# Patient Record
Sex: MALE | Race: WHITE | NOT HISPANIC OR LATINO | URBAN - METROPOLITAN AREA
[De-identification: names, ages, dates, MRNs, and addresses within clinical notes are randomized per-mention and may not be internally consistent; named-entity substitution may affect disease eponyms.]

---

## 2022-10-05 VITALS
TEMPERATURE: 98 F | WEIGHT: 149.91 LBS | OXYGEN SATURATION: 99 % | DIASTOLIC BLOOD PRESSURE: 83 MMHG | HEART RATE: 70 BPM | SYSTOLIC BLOOD PRESSURE: 131 MMHG | RESPIRATION RATE: 18 BRPM

## 2022-10-05 LAB
ALBUMIN SERPL ELPH-MCNC: 3.6 G/DL — SIGNIFICANT CHANGE UP (ref 3.4–5)
ALP SERPL-CCNC: 84 U/L — SIGNIFICANT CHANGE UP (ref 40–120)
ALT FLD-CCNC: 14 U/L — SIGNIFICANT CHANGE UP (ref 12–42)
ANION GAP SERPL CALC-SCNC: 4 MMOL/L — LOW (ref 9–16)
AST SERPL-CCNC: 17 U/L — SIGNIFICANT CHANGE UP (ref 15–37)
BASOPHILS # BLD AUTO: 0.07 K/UL — SIGNIFICANT CHANGE UP (ref 0–0.2)
BASOPHILS NFR BLD AUTO: 0.7 % — SIGNIFICANT CHANGE UP (ref 0–2)
BILIRUB SERPL-MCNC: 0.4 MG/DL — SIGNIFICANT CHANGE UP (ref 0.2–1.2)
BUN SERPL-MCNC: 7 MG/DL — SIGNIFICANT CHANGE UP (ref 7–23)
CALCIUM SERPL-MCNC: 9.3 MG/DL — SIGNIFICANT CHANGE UP (ref 8.5–10.5)
CHLORIDE SERPL-SCNC: 104 MMOL/L — SIGNIFICANT CHANGE UP (ref 96–108)
CO2 SERPL-SCNC: 30 MMOL/L — SIGNIFICANT CHANGE UP (ref 22–31)
CREAT SERPL-MCNC: 0.92 MG/DL — SIGNIFICANT CHANGE UP (ref 0.5–1.3)
D DIMER BLD IA.RAPID-MCNC: <187 NG/ML DDU — SIGNIFICANT CHANGE UP
EGFR: 113 ML/MIN/1.73M2 — SIGNIFICANT CHANGE UP
EOSINOPHIL # BLD AUTO: 0.38 K/UL — SIGNIFICANT CHANGE UP (ref 0–0.5)
EOSINOPHIL NFR BLD AUTO: 4 % — SIGNIFICANT CHANGE UP (ref 0–6)
GLUCOSE SERPL-MCNC: 90 MG/DL — SIGNIFICANT CHANGE UP (ref 70–99)
HCT VFR BLD CALC: 49.1 % — SIGNIFICANT CHANGE UP (ref 39–50)
HGB BLD-MCNC: 16.2 G/DL — SIGNIFICANT CHANGE UP (ref 13–17)
IMM GRANULOCYTES NFR BLD AUTO: 0.3 % — SIGNIFICANT CHANGE UP (ref 0–0.9)
LIDOCAIN IGE QN: 175 U/L — SIGNIFICANT CHANGE UP (ref 73–393)
LYMPHOCYTES # BLD AUTO: 3.53 K/UL — HIGH (ref 1–3.3)
LYMPHOCYTES # BLD AUTO: 37 % — SIGNIFICANT CHANGE UP (ref 13–44)
MAGNESIUM SERPL-MCNC: 2 MG/DL — SIGNIFICANT CHANGE UP (ref 1.6–2.6)
MCHC RBC-ENTMCNC: 31.3 PG — SIGNIFICANT CHANGE UP (ref 27–34)
MCHC RBC-ENTMCNC: 33 GM/DL — SIGNIFICANT CHANGE UP (ref 32–36)
MCV RBC AUTO: 94.8 FL — SIGNIFICANT CHANGE UP (ref 80–100)
MONOCYTES # BLD AUTO: 0.89 K/UL — SIGNIFICANT CHANGE UP (ref 0–0.9)
MONOCYTES NFR BLD AUTO: 9.3 % — SIGNIFICANT CHANGE UP (ref 2–14)
NEUTROPHILS # BLD AUTO: 4.63 K/UL — SIGNIFICANT CHANGE UP (ref 1.8–7.4)
NEUTROPHILS NFR BLD AUTO: 48.7 % — SIGNIFICANT CHANGE UP (ref 43–77)
NRBC # BLD: 0 /100 WBCS — SIGNIFICANT CHANGE UP (ref 0–0)
PLATELET # BLD AUTO: 258 K/UL — SIGNIFICANT CHANGE UP (ref 150–400)
POTASSIUM SERPL-MCNC: 4.4 MMOL/L — SIGNIFICANT CHANGE UP (ref 3.5–5.3)
POTASSIUM SERPL-SCNC: 4.4 MMOL/L — SIGNIFICANT CHANGE UP (ref 3.5–5.3)
PROT SERPL-MCNC: 7.4 G/DL — SIGNIFICANT CHANGE UP (ref 6.4–8.2)
RBC # BLD: 5.18 M/UL — SIGNIFICANT CHANGE UP (ref 4.2–5.8)
RBC # FLD: 14.2 % — SIGNIFICANT CHANGE UP (ref 10.3–14.5)
SODIUM SERPL-SCNC: 138 MMOL/L — SIGNIFICANT CHANGE UP (ref 132–145)
TROPONIN I, HIGH SENSITIVITY RESULT: 6.6 NG/L — SIGNIFICANT CHANGE UP
TROPONIN I, HIGH SENSITIVITY RESULT: 6.6 NG/L — SIGNIFICANT CHANGE UP
WBC # BLD: 9.53 K/UL — SIGNIFICANT CHANGE UP (ref 3.8–10.5)
WBC # FLD AUTO: 9.53 K/UL — SIGNIFICANT CHANGE UP (ref 3.8–10.5)

## 2022-10-05 PROCEDURE — 71275 CT ANGIOGRAPHY CHEST: CPT | Mod: 26

## 2022-10-05 RX ORDER — HEPARIN SODIUM 5000 [USP'U]/ML
5500 INJECTION INTRAVENOUS; SUBCUTANEOUS EVERY 6 HOURS
Refills: 0 | Status: DISCONTINUED | OUTPATIENT
Start: 2022-10-05 | End: 2022-10-06

## 2022-10-05 RX ORDER — SODIUM CHLORIDE 9 MG/ML
1000 INJECTION INTRAMUSCULAR; INTRAVENOUS; SUBCUTANEOUS ONCE
Refills: 0 | Status: COMPLETED | OUTPATIENT
Start: 2022-10-05 | End: 2022-10-05

## 2022-10-05 RX ORDER — KETOROLAC TROMETHAMINE 30 MG/ML
15 SYRINGE (ML) INJECTION ONCE
Refills: 0 | Status: DISCONTINUED | OUTPATIENT
Start: 2022-10-05 | End: 2022-10-05

## 2022-10-05 RX ORDER — HEPARIN SODIUM 5000 [USP'U]/ML
INJECTION INTRAVENOUS; SUBCUTANEOUS
Qty: 25000 | Refills: 0 | Status: DISCONTINUED | OUTPATIENT
Start: 2022-10-05 | End: 2022-10-06

## 2022-10-05 RX ORDER — HEPARIN SODIUM 5000 [USP'U]/ML
5500 INJECTION INTRAVENOUS; SUBCUTANEOUS ONCE
Refills: 0 | Status: COMPLETED | OUTPATIENT
Start: 2022-10-05 | End: 2022-10-06

## 2022-10-05 RX ORDER — HEPARIN SODIUM 5000 [USP'U]/ML
2500 INJECTION INTRAVENOUS; SUBCUTANEOUS EVERY 6 HOURS
Refills: 0 | Status: DISCONTINUED | OUTPATIENT
Start: 2022-10-05 | End: 2022-10-06

## 2022-10-05 RX ADMIN — Medication 15 MILLIGRAM(S): at 20:25

## 2022-10-05 RX ADMIN — SODIUM CHLORIDE 1000 MILLILITER(S): 9 INJECTION INTRAMUSCULAR; INTRAVENOUS; SUBCUTANEOUS at 20:25

## 2022-10-05 RX ADMIN — Medication 15 MILLIGRAM(S): at 23:14

## 2022-10-05 NOTE — ED PROVIDER NOTE - EKG #1 DATE/TIME
Providers


Date of admission: 


05/01/17 14:28





Attending physician: 


Mingo Inman





Primary care physician: 


ARCELIA Perkins [facility] Course: 








55-year-old female with history of multiple venous thromboembolism's wasn't 

being maintained on Xarelto comes in the hospital with progressive worsening of 

breathing difficulty over the last few days.





Over the last 2 weeks patient has been taking lower doses of xarelto 20 

milligrams every other day and a week ago she ran out of her medications due to 

loss of insurance coverage.





Patient was seen in the emergency room underwent a CT angiogram was noted to 

have bilateral pulmonary embolism with some RV collapse.





Patient was started on supplemental oxygen and triaged to the ICU Today patient 

was seen at bedside states that she does not have significant breathing 

difficulty at rest currently on 4-5 L of supplemental oxygen





Denies having headaches blurry vision nausea vomiting diarrhea urinary urgency 

or frequency.





Of note patient recently underwent a left axillary lymph node dissection 

apparently was negative for cancer





Patient was informed that she has factor V Leiden deficiency





5/3/17





Doing well


no new complaints reported


breathing is improved per her


on 2-3 l o2





4 2017





Patient is doing well at rest does not require any supplemental oxygen however 

on ambulation of at least 50 feet patient appears to be hypoxic in the low 80





Also appears to get tachycardic at that time.  Denies having headache, blurry 

vision, nausea, vomiting, any bleeding episodes.














- Exam


Physical exam Gen. appearance oriented 3 in no distress





Neck is supple no JVD





Lungs good air entry clear to auscultation no rhonchi or wheezing





Heart S1-S2 heard regular rate and rhythm no murmurs appreciated 





Abdomen is soft nontender no organomegaly bowel sounds are intact





Neurologically cranial nerves II-12 grossly intact no focal motor or sensory 

deficits noted





Skin no abnormalities appreciated














Assessment and Plan


Plan: 


#1 recurrent pulmonary embolism bilateral in a patient with factor V Leiden 

deficiency due to noncompliance this is due to loss of insurance





#2 hypothyroidism.  #3 obesity





#4 hypertension





#5 diabetes mellitus type 2 maintained on metformin





#6 hypoxic respiratory failure secondary to #1





#7 leukocytosis is likely reactive in nature





Plan





.  We'll start patient on Xarelto 15 mg by mouth twice a day for 21 days, and 

20mg daily for life, 





Is discharged home in a stable condition does not require oxygen at rest 

however with significant exertion does appear to be hypoxic hence will 

discharge the patient home on O2 will likely be able to titrate up the oxygen 

within the next 1-2 weeks














Plan - Discharge Summary


New Discharge Prescriptions: 


Rivaroxaban [Xarelto] 20 mg PO DAILY #30 tablet


Discharge Medication List





Atenolol [Tenormin] 25 mg PO HS 02/05/15 [History]


Furosemide [Lasix] 40 mg PO QAM 02/05/15 [History]


Levothyroxine Sodium [Synthroid] 175 mcg PO QAM 02/05/15 [History]


Potassium Chloride [Klor-Con 10] 10 meq PO QAM 02/05/15 [History]


Albuterol Inhaler [Ventolin Hfa Inhaler] 2 puff INHALATION RT-Q4H PRN 05/01/17 [

History]


Escitalopram [Lexapro] 5 mg PO DAILY 05/01/17 [History]


Insulin Degludec [Tresiba Flextouch U-100] 10 unit SQ DAILY 05/01/17 [History]


Naproxen Sodium [Naproxen Sodium DS] 550 mg PO BID 05/01/17 [History]


Omeprazole Magnesium 20.6mg 1 cap PO DAILY 05/01/17 [History]


metFORMIN HCL 1,000 mg PO BID 05/01/17 [History]


traZODone HCL 50 mg PO HS PRN 05/01/17 [History]


rOPINIRole HCL [Requip] 4 mg PO HS 05/03/17 [History]


Rivaroxaban [Xarelto] 20 mg PO DAILY #30 tablet 05/05/17 [Rx]








Follow up Appointment(s)/Referral(s): 


Eva Westfall MD [STAFF PHYSICIAN] - 1 Week (office closed, please call for 

follow up.)


[facility], [NON-STAFF] - 1 Week


ARCELIA العراقي III, MD [Primary Care Provider] - 05/08/17 9:45 am


Patient Instructions/Handouts:  Pulmonary Embolism (DC)


Activity/Diet/Wound Care/Special Instructions: 


[facility]: #[phone]





Xarelto has been approved by [employer] insurance plus 3 refills. Please take 

prescription to local pharmacy to be filled. Call [employer] [facility] with any problems 

or concerns- [phone]. 





Cardiac, diabetic diet. 


Discharge Disposition: HOME SELF-CARE
05-Oct-2022 18:18

## 2022-10-05 NOTE — ED PROVIDER NOTE - MUSCULOSKELETAL, MLM
TTP over the L upper chest wall in the region of the 3rd and 4th ICS and mid-clavicular line, no erythema. Mild TTP along the L triceps, non-tender biceps, compartments soft. Distal pulses intact. Cap refill 2 seconds. No lower extremity edema or calf tenderness.

## 2022-10-05 NOTE — ED ADULT TRIAGE NOTE - CHIEF COMPLAINT QUOTE
Pt complaining of left sided chest pain radiating to arm x 1 day. Hx of PE. Pt given 324 of aspirin by EMS.

## 2022-10-05 NOTE — ED PROVIDER NOTE - CHPI ED SYMPTOMS NEG
no HA, no dizziness, no lower extremity swelling/no back pain/no cough/no fever/no shortness of breath/no chills

## 2022-10-05 NOTE — ED PROVIDER NOTE - OBJECTIVE STATEMENT
33 yo M with PMHx of DVT/PE (not on anticoagulants) presenting to the ED with L sided chest pain radiating to the L upper arm intermittent for the last 3 months but worsening today. Pt states his pain is not as severe as when he was diagnosed with PE in 2021. Pt says he was on medication for a few months however, earlier this year, pt had passed through the [facility] [place] a [place] and was detained in a facility for 6 months. While there, he was evaluated by the facilities' doctors who told him that he does not require his meds restarted and was not DC with those meds. Pt's chest pain is described be stinging/sharp and noted to be along the L upper chest. Pain does not worsen with deep breath or movement, however it does worsen with palpation of the area. Pt endorses chest wall trauma prior to symptoms starting. Has not taken anything for pain. Pt is a pack per day smoker and endorses marijuana use. No associated cough, SOB, fever, chills, HA, dizziness, lower extremity swelling, back pain. 33 yo M with PMHx of DVT/PE (not on anticoagulants) presenting to the ED with L sided chest pain radiating to the L upper arm intermittent for the last 3 months but worsening today. Pt states his pain is not as severe as when he was diagnosed with PE in 2020. Pt says he was on medication [Xarelto] for a few months however, earlier this year, pt had passed through the [place] border on a political asylum venture and was detained in a facility for 6 months. While there, he was evaluated by the facilities' doctors who told him that he does not require his meds restarted and was not DC with those meds. Pt's chest pain is described as stinging/sharp and noted to be along the L upper chest. Pain does not worsen with deep breath or movement, however it does worsen with palpation of the area. Pt endorses chest wall trauma prior to symptoms starting. Has not taken anything for pain. Pt is a pack per day smoker and endorses marijuana use. No associated cough, SOB, fever, chills, HA, dizziness, lower extremity swelling, back pain.

## 2022-10-05 NOTE — ED PROVIDER NOTE - CARE PLAN
1 Principal Discharge DX:	Inferior vena caval thrombosis  Secondary Diagnosis:	Superior vena caval thrombosis  Secondary Diagnosis:	Subclavian vein thrombosis, left

## 2022-10-05 NOTE — ED PROVIDER NOTE - CLINICAL SUMMARY MEDICAL DECISION MAKING FREE TEXT BOX
33 yo M with PMHx of DVT/PE presenting to the ER with intermittent episodes of L sided chest wall pain x 3 months and reoccurring x 1 day. Pt reports chest wall pain on exam. Vitals stable in triage. Normal SpO2. No tachycardia or ischemic change on EKG. Clinically suspecting chest wall/muscular pain, however due to prior hx of DVT, will obtain CT Angio chest to r/o acute clot. Will obtain basic medical labs. Will give Toradol and IV fluids. Dispo pending medical workup.

## 2022-10-06 ENCOUNTER — INPATIENT (INPATIENT)
Facility: HOSPITAL | Age: 32
LOS: 0 days | Discharge: HOME | DRG: 294 | End: 2022-10-06
Attending: SURGERY | Admitting: SURGERY
Payer: COMMERCIAL

## 2022-10-06 VITALS — TEMPERATURE: 98 F

## 2022-10-06 DIAGNOSIS — I26.99 OTHER PULMONARY EMBOLISM WITHOUT ACUTE COR PULMONALE: ICD-10-CM

## 2022-10-06 DIAGNOSIS — I82.409 ACUTE EMBOLISM AND THROMBOSIS OF UNSPECIFIED DEEP VEINS OF UNSPECIFIED LOWER EXTREMITY: Chronic | ICD-10-CM

## 2022-10-06 LAB
ANION GAP SERPL CALC-SCNC: 5 MMOL/L — SIGNIFICANT CHANGE UP (ref 5–17)
ANION GAP SERPL CALC-SCNC: 6 MMOL/L — SIGNIFICANT CHANGE UP (ref 5–17)
APTT BLD: 163.7 SEC — CRITICAL HIGH (ref 27.5–35.5)
APTT BLD: 30.7 SEC — SIGNIFICANT CHANGE UP (ref 27.5–35.5)
APTT BLD: 60.3 SEC — HIGH (ref 27.5–35.5)
APTT BLD: 67.7 SEC — HIGH (ref 27.5–35.5)
BLD GP AB SCN SERPL QL: NEGATIVE — SIGNIFICANT CHANGE UP
BLD GP AB SCN SERPL QL: NEGATIVE — SIGNIFICANT CHANGE UP
BUN SERPL-MCNC: 7 MG/DL — SIGNIFICANT CHANGE UP (ref 7–23)
BUN SERPL-MCNC: 7 MG/DL — SIGNIFICANT CHANGE UP (ref 7–23)
CALCIUM SERPL-MCNC: 8.6 MG/DL — SIGNIFICANT CHANGE UP (ref 8.4–10.5)
CALCIUM SERPL-MCNC: 9.1 MG/DL — SIGNIFICANT CHANGE UP (ref 8.4–10.5)
CHLORIDE SERPL-SCNC: 108 MMOL/L — SIGNIFICANT CHANGE UP (ref 96–108)
CHLORIDE SERPL-SCNC: 109 MMOL/L — HIGH (ref 96–108)
CO2 SERPL-SCNC: 26 MMOL/L — SIGNIFICANT CHANGE UP (ref 22–31)
CO2 SERPL-SCNC: 28 MMOL/L — SIGNIFICANT CHANGE UP (ref 22–31)
CREAT SERPL-MCNC: 0.8 MG/DL — SIGNIFICANT CHANGE UP (ref 0.5–1.3)
CREAT SERPL-MCNC: 0.96 MG/DL — SIGNIFICANT CHANGE UP (ref 0.5–1.3)
EGFR: 108 ML/MIN/1.73M2 — SIGNIFICANT CHANGE UP
EGFR: 121 ML/MIN/1.73M2 — SIGNIFICANT CHANGE UP
GLUCOSE SERPL-MCNC: 86 MG/DL — SIGNIFICANT CHANGE UP (ref 70–99)
GLUCOSE SERPL-MCNC: 90 MG/DL — SIGNIFICANT CHANGE UP (ref 70–99)
HCT VFR BLD CALC: 45.7 % — SIGNIFICANT CHANGE UP (ref 39–50)
HCT VFR BLD CALC: 46.5 % — SIGNIFICANT CHANGE UP (ref 39–50)
HGB BLD-MCNC: 15.2 G/DL — SIGNIFICANT CHANGE UP (ref 13–17)
HGB BLD-MCNC: 15.4 G/DL — SIGNIFICANT CHANGE UP (ref 13–17)
INR BLD: 1.14 — SIGNIFICANT CHANGE UP (ref 0.88–1.16)
MAGNESIUM SERPL-MCNC: 2 MG/DL — SIGNIFICANT CHANGE UP (ref 1.6–2.6)
MCHC RBC-ENTMCNC: 31.1 PG — SIGNIFICANT CHANGE UP (ref 27–34)
MCHC RBC-ENTMCNC: 31.2 PG — SIGNIFICANT CHANGE UP (ref 27–34)
MCHC RBC-ENTMCNC: 33.1 GM/DL — SIGNIFICANT CHANGE UP (ref 32–36)
MCHC RBC-ENTMCNC: 33.3 GM/DL — SIGNIFICANT CHANGE UP (ref 32–36)
MCV RBC AUTO: 93.6 FL — SIGNIFICANT CHANGE UP (ref 80–100)
MCV RBC AUTO: 94.1 FL — SIGNIFICANT CHANGE UP (ref 80–100)
NRBC # BLD: 0 /100 WBCS — SIGNIFICANT CHANGE UP (ref 0–0)
NRBC # BLD: 0 /100 WBCS — SIGNIFICANT CHANGE UP (ref 0–0)
PHOSPHATE SERPL-MCNC: 3.6 MG/DL — SIGNIFICANT CHANGE UP (ref 2.5–4.5)
PLATELET # BLD AUTO: 240 K/UL — SIGNIFICANT CHANGE UP (ref 150–400)
PLATELET # BLD AUTO: 241 K/UL — SIGNIFICANT CHANGE UP (ref 150–400)
POTASSIUM SERPL-MCNC: 4 MMOL/L — SIGNIFICANT CHANGE UP (ref 3.5–5.3)
POTASSIUM SERPL-MCNC: 5.5 MMOL/L — HIGH (ref 3.5–5.3)
POTASSIUM SERPL-SCNC: 4 MMOL/L — SIGNIFICANT CHANGE UP (ref 3.5–5.3)
POTASSIUM SERPL-SCNC: 5.5 MMOL/L — HIGH (ref 3.5–5.3)
PROTHROM AB SERPL-ACNC: 13.2 SEC — SIGNIFICANT CHANGE UP (ref 10.5–13.4)
RBC # BLD: 4.88 M/UL — SIGNIFICANT CHANGE UP (ref 4.2–5.8)
RBC # BLD: 4.94 M/UL — SIGNIFICANT CHANGE UP (ref 4.2–5.8)
RBC # FLD: 14.1 % — SIGNIFICANT CHANGE UP (ref 10.3–14.5)
RBC # FLD: 14.3 % — SIGNIFICANT CHANGE UP (ref 10.3–14.5)
RH IG SCN BLD-IMP: POSITIVE — SIGNIFICANT CHANGE UP
RH IG SCN BLD-IMP: POSITIVE — SIGNIFICANT CHANGE UP
SARS-COV-2 RNA SPEC QL NAA+PROBE: SIGNIFICANT CHANGE UP
SODIUM SERPL-SCNC: 141 MMOL/L — SIGNIFICANT CHANGE UP (ref 135–145)
SODIUM SERPL-SCNC: 141 MMOL/L — SIGNIFICANT CHANGE UP (ref 135–145)
WBC # BLD: 7.72 K/UL — SIGNIFICANT CHANGE UP (ref 3.8–10.5)
WBC # BLD: 9.96 K/UL — SIGNIFICANT CHANGE UP (ref 3.8–10.5)
WBC # FLD AUTO: 7.72 K/UL — SIGNIFICANT CHANGE UP (ref 3.8–10.5)
WBC # FLD AUTO: 9.96 K/UL — SIGNIFICANT CHANGE UP (ref 3.8–10.5)

## 2022-10-06 PROCEDURE — 85610 PROTHROMBIN TIME: CPT

## 2022-10-06 PROCEDURE — 71275 CT ANGIOGRAPHY CHEST: CPT

## 2022-10-06 PROCEDURE — 83690 ASSAY OF LIPASE: CPT

## 2022-10-06 PROCEDURE — 70450 CT HEAD/BRAIN W/O DYE: CPT

## 2022-10-06 PROCEDURE — 96375 TX/PRO/DX INJ NEW DRUG ADDON: CPT

## 2022-10-06 PROCEDURE — 74177 CT ABD & PELVIS W/CONTRAST: CPT | Mod: 26,59

## 2022-10-06 PROCEDURE — 93307 TTE W/O DOPPLER COMPLETE: CPT

## 2022-10-06 PROCEDURE — 75573 CT HRT C+ STRUX CGEN HRT DS: CPT

## 2022-10-06 PROCEDURE — 99223 1ST HOSP IP/OBS HIGH 75: CPT | Mod: GC

## 2022-10-06 PROCEDURE — 83735 ASSAY OF MAGNESIUM: CPT

## 2022-10-06 PROCEDURE — 93306 TTE W/DOPPLER COMPLETE: CPT | Mod: 26

## 2022-10-06 PROCEDURE — 71045 X-RAY EXAM CHEST 1 VIEW: CPT

## 2022-10-06 PROCEDURE — 99221 1ST HOSP IP/OBS SF/LOW 40: CPT

## 2022-10-06 PROCEDURE — 86900 BLOOD TYPING SEROLOGIC ABO: CPT

## 2022-10-06 PROCEDURE — 99253 IP/OBS CNSLTJ NEW/EST LOW 45: CPT

## 2022-10-06 PROCEDURE — 74177 CT ABD & PELVIS W/CONTRAST: CPT

## 2022-10-06 PROCEDURE — 80048 BASIC METABOLIC PNL TOTAL CA: CPT

## 2022-10-06 PROCEDURE — 75573 CT HRT C+ STRUX CGEN HRT DS: CPT | Mod: 26

## 2022-10-06 PROCEDURE — 86850 RBC ANTIBODY SCREEN: CPT

## 2022-10-06 PROCEDURE — 84100 ASSAY OF PHOSPHORUS: CPT

## 2022-10-06 PROCEDURE — 86901 BLOOD TYPING SEROLOGIC RH(D): CPT

## 2022-10-06 PROCEDURE — 85027 COMPLETE CBC AUTOMATED: CPT

## 2022-10-06 PROCEDURE — 85025 COMPLETE CBC W/AUTO DIFF WBC: CPT

## 2022-10-06 PROCEDURE — 96374 THER/PROPH/DIAG INJ IV PUSH: CPT

## 2022-10-06 PROCEDURE — 99285 EMERGENCY DEPT VISIT HI MDM: CPT

## 2022-10-06 PROCEDURE — 74174 CTA ABD&PLVS W/CONTRAST: CPT | Mod: 26

## 2022-10-06 PROCEDURE — 85730 THROMBOPLASTIN TIME PARTIAL: CPT

## 2022-10-06 PROCEDURE — 71045 X-RAY EXAM CHEST 1 VIEW: CPT | Mod: 26

## 2022-10-06 PROCEDURE — 70450 CT HEAD/BRAIN W/O DYE: CPT | Mod: 26

## 2022-10-06 PROCEDURE — 99284 EMERGENCY DEPT VISIT MOD MDM: CPT

## 2022-10-06 PROCEDURE — 74174 CTA ABD&PLVS W/CONTRAST: CPT

## 2022-10-06 PROCEDURE — 93010 ELECTROCARDIOGRAM REPORT: CPT

## 2022-10-06 PROCEDURE — 80053 COMPREHEN METABOLIC PANEL: CPT

## 2022-10-06 PROCEDURE — 87635 SARS-COV-2 COVID-19 AMP PRB: CPT

## 2022-10-06 PROCEDURE — 85379 FIBRIN DEGRADATION QUANT: CPT

## 2022-10-06 PROCEDURE — 84484 ASSAY OF TROPONIN QUANT: CPT

## 2022-10-06 PROCEDURE — 36415 COLL VENOUS BLD VENIPUNCTURE: CPT

## 2022-10-06 RX ORDER — HEPARIN SODIUM 5000 [USP'U]/ML
1200 INJECTION INTRAVENOUS; SUBCUTANEOUS
Qty: 25000 | Refills: 0 | Status: DISCONTINUED | OUTPATIENT
Start: 2022-10-06 | End: 2022-10-06

## 2022-10-06 RX ORDER — INFLUENZA VIRUS VACCINE 15; 15; 15; 15 UG/.5ML; UG/.5ML; UG/.5ML; UG/.5ML
0.5 SUSPENSION INTRAMUSCULAR ONCE
Refills: 0 | Status: DISCONTINUED | OUTPATIENT
Start: 2022-10-06 | End: 2022-10-06

## 2022-10-06 RX ORDER — HEPARIN SODIUM 5000 [USP'U]/ML
1400 INJECTION INTRAVENOUS; SUBCUTANEOUS
Qty: 25000 | Refills: 0 | Status: DISCONTINUED | OUTPATIENT
Start: 2022-10-06 | End: 2022-10-06

## 2022-10-06 RX ORDER — RIVAROXABAN 15 MG-20MG
1 KIT ORAL
Qty: 0 | Refills: 0 | DISCHARGE

## 2022-10-06 RX ORDER — HYDROMORPHONE HYDROCHLORIDE 2 MG/ML
0.5 INJECTION INTRAMUSCULAR; INTRAVENOUS; SUBCUTANEOUS ONCE
Refills: 0 | Status: DISCONTINUED | OUTPATIENT
Start: 2022-10-06 | End: 2022-10-06

## 2022-10-06 RX ORDER — NICOTINE POLACRILEX 2 MG
1 GUM BUCCAL DAILY
Refills: 0 | Status: DISCONTINUED | OUTPATIENT
Start: 2022-10-06 | End: 2022-10-06

## 2022-10-06 RX ORDER — SODIUM CHLORIDE 9 MG/ML
1000 INJECTION INTRAMUSCULAR; INTRAVENOUS; SUBCUTANEOUS
Refills: 0 | Status: DISCONTINUED | OUTPATIENT
Start: 2022-10-06 | End: 2022-10-06

## 2022-10-06 RX ADMIN — Medication 1 PATCH: at 18:22

## 2022-10-06 RX ADMIN — Medication 1 PATCH: at 15:59

## 2022-10-06 RX ADMIN — HEPARIN SODIUM 5500 UNIT(S): 5000 INJECTION INTRAVENOUS; SUBCUTANEOUS at 00:07

## 2022-10-06 RX ADMIN — HEPARIN SODIUM 1200 UNIT(S)/HR: 5000 INJECTION INTRAVENOUS; SUBCUTANEOUS at 00:09

## 2022-10-06 RX ADMIN — HYDROMORPHONE HYDROCHLORIDE 0.5 MILLIGRAM(S): 2 INJECTION INTRAMUSCULAR; INTRAVENOUS; SUBCUTANEOUS at 10:06

## 2022-10-06 RX ADMIN — HYDROMORPHONE HYDROCHLORIDE 0.5 MILLIGRAM(S): 2 INJECTION INTRAMUSCULAR; INTRAVENOUS; SUBCUTANEOUS at 13:02

## 2022-10-06 RX ADMIN — SODIUM CHLORIDE 60 MILLILITER(S): 9 INJECTION INTRAMUSCULAR; INTRAVENOUS; SUBCUTANEOUS at 07:00

## 2022-10-06 NOTE — CONSULT NOTE ADULT - ASSESSMENT
32-year-old male with a PMHx of DVT (?unprovoked, no longer on Xarelto) who presented with chest pain, found to have extensive clot burden with thrombi in the IVC/SVC/RA via CT-PE along with RLL pleural based nodule concerning for possible malignancy.  As per patient, it appears prior DVT/PE was unprovoked and now with recurrent thromboembolic disease concerning for underlying hypercoagulable state and/or malignancy.       #SVC/IVC Thrombus    -continue with heparin gtt   -follow up repeat CT-C/A/P to further assess clot burden and pleural based nodule   -TTE within normal limits, no signs of thrombus in RA as mentioned on CT   -CT Surgery consulted, recommendations pending repeat CT  -if no evidence of malignancy on CT, would recommend hematology consult for hyper-coagulable work up      #Alcohol Abuse  -last drink reportedly 1 week ago, no history of withdrawal   -monitor for signs/symptoms of withdrawal    #Tobacco Abuse  -offer nicotine patch while inpatient     DVT PPx: heparin drip     Dispo: pending

## 2022-10-06 NOTE — CONSULT NOTE ADULT - ASSESSMENT
Assesment:  33 YO Male with hx of DVT/PE was on Xarelto but stopped in the last 6 months who presents with chest pain and noted to have Thrombus in IVC, SVC, and Rt Atrium. Patient was transferred from [facility] with above noted findings and on heparin gtt. On interview today patient denies any right leg pain, swelling, weakness, chest pain, SOB, N,V, headache or blurred vision. Patient reports that he was detained by  on the [place] border 6[place] ago. During his detainment his Xarelto was stopped. He admits to 1ppd smoking and alcoholism. His last drink was 5 days ago. Further work showed no evidence of thrombus but found a solid pulmonary nodule right posterior basal segment with pleural contact 1.7 x 1.4 x 1.3 cm in size.  Thoracic surgery consulted for evaluation.    Plan:  Problem 1: Pulmonary nodule  Recommend PET scan for further evaluation of nodule  Will discuss with Dr. Reese    Problem 2: Current smoker  Recommend smoking cessation education  Nicotine patch    Problem 3: Alcoholism  Monitor for DTs.    I have reviewed clinical labs tests and reports, radiology tests and reports, as well as old patient medical records, and discussed with the referring physician.       Assesment:  33 YO Male with hx of DVT/PE was on Xarelto but stopped in the last 6 months who presents with chest pain and noted to have Thrombus in IVC, SVC, and Rt Atrium. Patient was transferred from [facility] with above noted findings and on heparin gtt. On interview today patient denies any right leg pain, swelling, weakness, chest pain, SOB, N,V, headache or blurred vision. Patient reports that he was detained by  on the [place] border 6months ago. During his detainment his Xarelto was stopped. He admits to 1ppd smoking and alcoholism. His last drink was 5 days ago. Further work showed no evidence of thrombus but found a solid pulmonary nodule right posterior basal segment with pleural contact 1.7 x 1.4 x 1.3 cm in size.  Thoracic surgery consulted for evaluation.    Plan:  Problem 1: Pulmonary nodule  Recommend IR guided biopsy   Patient refusing biopsy, if continues to refuse recommend PET scan and outpatient follow up.  If patient refuses PET then recommend outpatient followup with CT in 3 months.  Discussed with Dr. Reese    Problem 2: Current smoker  Recommend smoking cessation education  Nicotine patch    Problem 3: Alcoholism  Monitor for DTs.    I have reviewed clinical labs tests and reports, radiology tests and reports, as well as old patient medical records, and discussed with the referring physician.

## 2022-10-06 NOTE — CONSULT NOTE ADULT - ASSESSMENT
Assesment:  32y Male        Plan:  Problem 1:      Problem 2:      Problem 3:      Problem 4:    I have reviewed clinical labs tests and reports, radiology tests and reports, as well as old patient medical records, and discussed with the refering physician.     Assesment:  31 YO Male with hx of DVT/PE was on Xarelto but stopped in the last 6 months who presents with chest pain and noted to have Thrombus in IVC, SVC, and Rt Atrium. Patient was transferred from [facility] with above noted findings and on heparin gtt. On interview today patient denies any right leg pain, swelling, weakness, chest pain, SOB, N,V, headache or blurred vision. Patient reports that he was detained by  on the [place] border 6months ago. During his detainment his Xarelto was stopped. He admits to 1ppd smoking and alcoholism. His last drink was 5 days ago.  Structural heart consulted for evaluation.    Plan:  Problem 1: SVC/IVC/RA thrombus  Follow up CT heart  Continue heparin  Discussed with Dr. Glez, plan pending CT result    Problem 2: Current smoker  Recommend smoking cessation education  Nicotine patch    Problem 3: Alcoholism  Monitor for DTs.    I have reviewed clinical labs tests and reports, radiology tests and reports, as well as old patient medical records, and discussed with the referring physician.     Assesment:  31 YO Male with hx of DVT/PE was on Xarelto but stopped in the last 6 months who presents with chest pain and noted to have Thrombus in IVC, SVC, and Rt Atrium. Patient was transferred from [facility] with above noted findings and on heparin gtt. On interview today patient denies any right leg pain, swelling, weakness, chest pain, SOB, N,V, headache or blurred vision. Patient reports that he was detained by  on the [place] border 6months ago. During his detainment his Xarelto was stopped. He admits to 1ppd smoking and alcoholism. His last drink was 5 days ago.  Structural heart consulted for evaluation.    Plan:  Problem 1: SVC/IVC/RA thrombus  CT heart congenital reviewed with Dr. Glez  No evidence of thrombus  Structural heart will sign off please reconsult if necessary  Discussed with Dr. Glez      Problem 2: Current smoker  Recommend smoking cessation education  Nicotine patch    Problem 3: Alcoholism  Monitor for DTs.    I have reviewed clinical labs tests and reports, radiology tests and reports, as well as old patient medical records, and discussed with the referring physician.
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
## 2022-10-06 NOTE — PATIENT PROFILE ADULT - FALL HARM RISK - RISK INTERVENTIONS

## 2022-10-06 NOTE — ED ADULT NURSE REASSESSMENT NOTE - NS ED NURSE REASSESS COMMENT FT1
services used (Id #[id number]) used to confirm patient's understanding of the reason why he is being placed on heparin. Discussed concerns, and potential side effects.

## 2022-10-06 NOTE — CONSULT NOTE ADULT - SUBJECTIVE AND OBJECTIVE BOX
Surgeon: Amaris    Requesting Physician: Rosie    HISTORY OF PRESENT ILLNESS (Need 4):  32y Male    PAST MEDICAL & SURGICAL HISTORY:  Pulmonary embolism      DVT, lower extremity          MEDICATIONS  (STANDING):  heparin  Infusion.  Unit(s)/Hr (12 mL/Hr) IV Continuous <Continuous>  sodium chloride 0.9%. 1000 milliLiter(s) (60 mL/Hr) IV Continuous <Continuous>    MEDICATIONS  (PRN):      Allergies    No Known Allergies    Intolerances        SOCIAL HISTORY:  Smoker:  YES / NO        PACK YEARS:                         WHEN QUIT?  ETOH use:  YES / NO               FREQUENCY / QUANTITY:  Ilicit Drug use:  YES / NO  Occupation:  Assisted device use (Cane / Walker):  Live with:    FAMILY HISTORY:      Review of Systems (Need 10):  CONSTITUTIONAL: Denies fevers / chills, sweats, fatigue, weight loss, weight gain                                       NEURO:  Denies parathesias, seizures, syncope, confusion                                                                                  EYES:  Denies blurry vision, discharge, pain, loss of vision                                                                                    ENMT:  Denies difficulty hearing, vertigo, dysphagia, epistaxis, recent dental work                                       CV:  Denies chest pain, palpitations, LAND, orthopnea                                                                                           RESPIRATORY:  Denies wWheezing, SOB, cough / sputum, hemoptysis                                                               GI:  Denies nausea, vomiting, diarrhea, constipation, melena                                                                          : Denies hematuria, dysuria, urgency, incontinence                                                                                          MUSKULOSKELETAL:  Denies arthritis, joint swelling, muscle weakness                                                             SKIN/BREAST:  Denies rash, itching, hair loss, masses                                                                                              PSYCH:  Denies depression, anxiety, suicidal ideation                                                                                                HEME/LYMPH:  Denies bruises easily, enlarged lymph nodes, tender lymph nodes                                          ENDOCRINE:  Denies cold intolerance, heat intolerance, polydipsia                                                                      Vital Signs Last 24 Hrs  T(C): 36.8 (06 Oct 2022 04:26), Max: 36.8 (06 Oct 2022 04:26)  T(F): 98.3 (06 Oct 2022 04:26), Max: 98.3 (06 Oct 2022 04:26)  HR: 50 (06 Oct 2022 03:05) (50 - 70)  BP: 121/78 (06 Oct 2022 03:05) (113/71 - 131/83)  BP(mean): --  RR: 18 (06 Oct 2022 03:05) (18 - 18)  SpO2: 97% (06 Oct 2022 03:05) (97% - 99%)    Parameters below as of 06 Oct 2022 03:05  Patient On (Oxygen Delivery Method): room air        Physical Exam (Need 8)  CONSTITUTIONAL:                                                                          WNL  NEURO:                                                                                             WNL                      EYES:                                                                                                  WNL  ENMT:                                                                                                WNL  CV:                                                                                                      WNL  RESPIRATORY:                                                                                  WNL  GI:                                                                                                       WNL  : DORMAN + / -                                                                                 WNL  MUSKULOSKELETAL:                                                                       WNL  SKIN / BREAST:                                                                                 WNL                                                          LABS:                        15.4   9.96  )-----------( 240      ( 06 Oct 2022 06:49 )             46.5     10-06    141  |  108  |  7   ----------------------------<  86  5.5<H>   |  28  |  0.96    Ca    9.1      06 Oct 2022 06:49  Phos  3.6     10-06  Mg     2.0     10-06    TPro  7.4  /  Alb  3.6  /  TBili  0.4  /  DBili  x   /  AST  17  /  ALT  14  /  AlkPhos  84  10-05    PT/INR - ( 05 Oct 2022 23:13 )   PT: 13.2 sec;   INR: 1.14          PTT - ( 06 Oct 2022 06:49 )  PTT:163.7 sec            RADIOLOGY & ADDITIONAL STUDIES:  CAROTID U/S:    CXR:    CT Scan:    EKG:    TTE / ANGELES:    Cardiac Cath: Surgeon: Amaris    Requesting Physician: Rosie    HISTORY OF PRESENT ILLNESS (Need 4):  33 YO Male with hx of DVT/PE was on Xarelto but stopped in the last 6 months who presents with chest pain and noted to have Thrombus in IVC, SVC, and Rt Atrium. Patient was transferred from [facility] with above noted findings and on heparin gtt. On interview today patient denies any right leg pain, swelling, weakness, chest pain, SOB, N,V, headache or blurred vision. Patient reports that [place] was [place] 6[place]. During his detainment his Xarelto was stopped. He admits to 1ppd smoking and alcoholism. His last drink was 5 days ago. Structural heart consulted for evaluation.    PAST MEDICAL & SURGICAL HISTORY:  Pulmonary embolism      DVT, lower extremity          MEDICATIONS  (STANDING):  heparin  Infusion.  Unit(s)/Hr (12 mL/Hr) IV Continuous <Continuous>  sodium chloride 0.9%. 1000 milliLiter(s) (60 mL/Hr) IV Continuous <Continuous>    MEDICATIONS  (PRN):      Allergies    No Known Allergies    Intolerances        SOCIAL HISTORY:  Smoker:  YES 1ppd  ETOH use:  YES Last drink 5 yrs ago  Ilicit Drug use:  No  Live with:    FAMILY HISTORY:      Review of Systems (Need 10):  CONSTITUTIONAL: Denies fevers / chills, sweats, fatigue, weight loss, weight gain                                       NEURO:  Denies parathesias, seizures, syncope, confusion                                                                                  EYES:  Denies blurry vision, discharge, pain, loss of vision                                                                                    ENMT:  Denies difficulty hearing, vertigo, dysphagia, epistaxis, recent dental work                                       CV:  Denies chest pain, palpitations, LAND, orthopnea                                                                                           RESPIRATORY:  Denies wWheezing, SOB, cough / sputum, hemoptysis                                                               GI:  Denies nausea, vomiting, diarrhea, constipation, melena                                                                          : Denies hematuria, dysuria, urgency, incontinence                                                                                          MUSKULOSKELETAL:  Denies arthritis, joint swelling, muscle weakness                                                             SKIN/BREAST:  Denies rash, itching, hair loss, masses                                                                                              PSYCH:  Denies depression, anxiety, suicidal ideation                                                                                                HEME/LYMPH:  Denies bruises easily, enlarged lymph nodes, tender lymph nodes                                          ENDOCRINE:  Denies cold intolerance, heat intolerance, polydipsia                                                                      Vital Signs Last 24 Hrs  T(C): 36.8 (06 Oct 2022 04:26), Max: 36.8 (06 Oct 2022 04:26)  T(F): 98.3 (06 Oct 2022 04:26), Max: 98.3 (06 Oct 2022 04:26)  HR: 50 (06 Oct 2022 03:05) (50 - 70)  BP: 121/78 (06 Oct 2022 03:05) (113/71 - 131/83)  BP(mean): --  RR: 18 (06 Oct 2022 03:05) (18 - 18)  SpO2: 97% (06 Oct 2022 03:05) (97% - 99%)    Parameters below as of 06 Oct 2022 03:05  Patient On (Oxygen Delivery Method): room air        Physical Exam (Need 8)  GEN: NAD, looks comfortable  Psych: Mood appropriate  Neuro: A&Ox3.  No focal deficits.  Moving all extremities.   HEENT: No obvious abnormalities  CV: S1S2, regular, no murmurs appreciated.  No carotid bruits.  No JVD  Lungs: Clear B/L.  No wheezing, rales or rhonchi  ABD: Soft, non-tender, non-distended.  +Bowel sounds  EXT: Warm and well perfused.  No peripheral edema noted  Musculoskeletal: Moving all extremities with normal ROM, no joint swelling  PV: Pedal pulses palpable                                                            LABS:                        15.4   9.96  )-----------( 240      ( 06 Oct 2022 06:49 )             46.5     10-06    141  |  108  |  7   ----------------------------<  86  5.5<H>   |  28  |  0.96    Ca    9.1      06 Oct 2022 06:49  Phos  3.6     10-06  Mg     2.0     10-06    TPro  7.4  /  Alb  3.6  /  TBili  0.4  /  DBili  x   /  AST  17  /  ALT  14  /  AlkPhos  84  10-05    PT/INR - ( 05 Oct 2022 23:13 )   PT: 13.2 sec;   INR: 1.14          PTT - ( 06 Oct 2022 06:49 )  PTT:163.7 sec            RADIOLOGY & ADDITIONAL STUDIES:    CT Scan: c< from: CT Angio Chest PE Protocol w/ IV Cont (10.05.22 @ 21:34) >  No acute pulmonary embolism.  Venous thrombosis extending from the intrahepatic IVC into the SVC/left   subclavian and right atrium. Etiology may include bland thrombus   (hypercoagulable state/venous stasis) and tumor thrombus.    < end of copied text >  < from: CT Head No Cont (10.06.22 @ 11:14) >  Normal noncontrast CT of the brain.    < end of copied text >      TTE / ANGELES: < from: Echo W Bubble w/o Doppler Complete (10.06.22 @ 09:08) >   1. Normal left and right ventricular size and systolic function.   2. Injection of agitated saline via a peripheral vein reveals no   evidence of a right-to-left shunt.   3. No significant valvular disease.   4. No evidence of pulmonary hypertension, pulmonary artery systolic   pressure is 27 mmHg.   5. No pericardial effusion.   6. No prior echo is available for comparison.    < end of copied text >

## 2022-10-06 NOTE — H&P ADULT - ASSESSMENT
A/P: 32y YO Male with hx of DVT/PE off AC for several months now admitted with new DVT and thrombus in IVC, SVC, and atrial thrombus     DVT/PE:   - on heparin gtt at 12ml/hr   - CT chest abd/pelvis   - f/u echo   -appreciate CTsx recs     Diet:   -NPO     Activity:   -Bedrest     DVTPPx:   -Heparin gtt   Dispo: awaiting Chest CT and Echo    A/P: 32y YO Male with hx of DVT/PE off AC for several months now admitted  thrombus in IVC, SVC, and atrial thrombus     DVT/PE:   - on heparin gtt at 12ml/hr   - CT chest abd/pelvis   - f/u echo   -appreciate CTsx recs     Diet:   -NPO     Activity:   -Bedrest     DVTPPx:   -Heparin gtt   Dispo: awaiting Chest CT and Echo    A/P: 32y YO Male with hx of DVT/PE off AC for several months now admitted  thrombus in IVC, SVC, and atrial thrombus     DVT/PE:   - on heparin gtt at 12ml/hr   - CT chest abd/pelvis   - f/u echo   -appreciate CTsx recs     Lung Nodule:   -noted on CT at [facility]   -will repeat CT     Diet:   -NPO     Activity:   -Bedrest     DVTPPx:   -Heparin gtt   Dispo: awaiting Chest CT and Echo

## 2022-10-06 NOTE — H&P ADULT - NSHPPHYSICALEXAM_GEN_ALL_CORE
Gen: NAD  Card: S1S2 Reg   Pulm: b/l breath sounds   Abd: Soft   LE: Pedal pulse palpable bilaterally   no calf tenderness   compartments of upper and lower leg soft   Motor and sensation intact

## 2022-10-06 NOTE — PROVIDER CONTACT NOTE (OTHER) - SITUATION
Patient admitted from [facility] w. C/O CP, SOB, DX w. pulmonary Embolism ,

## 2022-10-06 NOTE — H&P ADULT - HISTORY OF PRESENT ILLNESS
31 YO Male with hx of DVT/PE was on Xarelto but stopped in the last 6 months who presents with chest pain and noted to have RLE DVT and Thrombus in IVC, SVC, and Rt Atrium. Patient was transferred from [facility] with above noted findings and on heparin gtt. On interview today patient denies any right leg pain, swelling, weakness, chest pain, SOB, N,V, headache or blurred vision. Patient reports that [place]. During his detainment his Xarelto was stopped. He admits to 1ppd smoking and alcoholism. His last drink was 5 days ago     Denies Allergies    33 YO Male with hx of DVT/PE was on Xarelto but stopped in the last 6 months who presents with chest pain and noted to have Thrombus in IVC, SVC, and Rt Atrium. Patient was transferred from [facility] with above noted findings and on heparin gtt. On interview today patient denies any right leg pain, swelling, weakness, chest pain, SOB, N,V, headache or blurred vision. Patient reports that he was [place] 6[place]. During his detainment his Xarelto was stopped. He admits to 1ppd smoking and alcoholism. His last drink was 5 days ago     Denies Allergies

## 2022-10-07 ENCOUNTER — EMERGENCY (EMERGENCY)
Facility: HOSPITAL | Age: 32
LOS: 1 days | Discharge: HOME | End: 2022-10-07
Admitting: EMERGENCY MEDICINE

## 2022-10-07 VITALS
TEMPERATURE: 98 F | DIASTOLIC BLOOD PRESSURE: 79 MMHG | OXYGEN SATURATION: 98 % | SYSTOLIC BLOOD PRESSURE: 128 MMHG | HEART RATE: 88 BPM | RESPIRATION RATE: 20 BRPM

## 2022-10-07 VITALS
WEIGHT: 162.48 LBS | HEART RATE: 90 BPM | TEMPERATURE: 98 F | DIASTOLIC BLOOD PRESSURE: 90 MMHG | HEIGHT: 69.69 IN | OXYGEN SATURATION: 96 % | RESPIRATION RATE: 17 BRPM | SYSTOLIC BLOOD PRESSURE: 130 MMHG

## 2022-10-07 DIAGNOSIS — I82.409 ACUTE EMBOLISM AND THROMBOSIS OF UNSPECIFIED DEEP VEINS OF UNSPECIFIED LOWER EXTREMITY: Chronic | ICD-10-CM

## 2022-10-07 PROBLEM — I26.99 OTHER PULMONARY EMBOLISM WITHOUT ACUTE COR PULMONALE: Chronic | Status: ACTIVE | Noted: 2022-10-05

## 2022-10-07 PROCEDURE — 99284 EMERGENCY DEPT VISIT MOD MDM: CPT

## 2022-10-07 PROCEDURE — 99053 MED SERV 10PM-8AM 24 HR FAC: CPT

## 2022-10-07 PROCEDURE — 93010 ELECTROCARDIOGRAM REPORT: CPT

## 2022-10-07 RX ORDER — DIAZEPAM 5 MG
5 TABLET ORAL ONCE
Refills: 0 | Status: DISCONTINUED | OUTPATIENT
Start: 2022-10-07 | End: 2022-10-07

## 2022-10-07 RX ADMIN — Medication 5 MILLIGRAM(S): at 00:41

## 2022-10-07 RX ADMIN — Medication 5 MILLIGRAM(S): at 01:38

## 2022-10-07 NOTE — ED PROVIDER NOTE - CLINICAL SUMMARY MEDICAL DECISION MAKING FREE TEXT BOX
pt pw concern for documents he left behind when he eloped from the hospital, plan: cbc, cmp, trop, ecg, call [facility]

## 2022-10-07 NOTE — ED PROVIDER NOTE - OBJECTIVE STATEMENT
33 yo BIB EMS to the ED stating that he was admitted to the hospital but left the hospital and forgot his documents at bedside requesting to get the documents back from the hospital. Pt was admitted for potential thrombus in ivc, svc, and subclavian, after evaluation by cardiothoracic surgery and vascular as well as repeat ct, it was confirmed that initial read likely untrue and pt does not have vc thrombus. No cp or sob in the ed.    I have reviewed available current nursing and previous documentation of past medical, surgical, family, and/or social history.

## 2022-10-07 NOTE — ED ADULT NURSE NOTE - OBJECTIVE STATEMENT
31 YO M here with multiple medical complaints, A&OX3, anxious, pt was seen here yesterday  with concern for thrombus in IVC extending to right atrium,  official reading on radiology report is no thrombus.  given valium for anxiety, pt reported chest pain, EKG obtained.  per provider hold all labs at this time.    pt is extremely upset he left his documents at [facility], we are calling the hospital to try to track down the documents.

## 2022-10-07 NOTE — ED ADULT TRIAGE NOTE - CHIEF COMPLAINT QUOTE
Pt states "I feel like shit all over, everything hurts". Pt states he was seen yesterday in ED for same complaints. Pt denies CP or SOB at this time.

## 2022-10-07 NOTE — ED PROVIDER NOTE - PATIENT PORTAL LINK FT
You can access the FollowMyHealth Patient Portal offered by [facility] by registering at the following website: [email or web address]. By joining [facility]’s FollowMyHealth portal, you will also be able to view your health information using other applications (apps) compatible with our system.

## 2022-10-10 DIAGNOSIS — Z00.8 ENCOUNTER FOR OTHER GENERAL EXAMINATION: ICD-10-CM

## 2022-10-10 DIAGNOSIS — Z79.01 LONG TERM (CURRENT) USE OF ANTICOAGULANTS: ICD-10-CM

## 2022-10-13 DIAGNOSIS — F17.210 NICOTINE DEPENDENCE, CIGARETTES, UNCOMPLICATED: ICD-10-CM

## 2022-10-13 DIAGNOSIS — Z86.718 PERSONAL HISTORY OF OTHER VENOUS THROMBOSIS AND EMBOLISM: ICD-10-CM

## 2022-10-13 DIAGNOSIS — I82.210 ACUTE EMBOLISM AND THROMBOSIS OF SUPERIOR VENA CAVA: ICD-10-CM

## 2022-10-13 DIAGNOSIS — I82.220 ACUTE EMBOLISM AND THROMBOSIS OF INFERIOR VENA CAVA: ICD-10-CM

## 2022-10-13 DIAGNOSIS — Z86.711 PERSONAL HISTORY OF PULMONARY EMBOLISM: ICD-10-CM

## 2022-10-13 DIAGNOSIS — Z79.01 LONG TERM (CURRENT) USE OF ANTICOAGULANTS: ICD-10-CM

## 2022-10-13 DIAGNOSIS — F10.20 ALCOHOL DEPENDENCE, UNCOMPLICATED: ICD-10-CM

## 2022-10-13 DIAGNOSIS — D68.59 OTHER PRIMARY THROMBOPHILIA: ICD-10-CM

## 2022-10-13 DIAGNOSIS — R91.1 SOLITARY PULMONARY NODULE: ICD-10-CM

## 2022-10-13 DIAGNOSIS — I82.B12 ACUTE EMBOLISM AND THROMBOSIS OF LEFT SUBCLAVIAN VEIN: ICD-10-CM

## 2022-11-23 ENCOUNTER — HOSPITAL ENCOUNTER (EMERGENCY)
Dept: HOSPITAL 54 - ER | Age: 32
Discharge: HOME | End: 2022-11-23
Payer: MEDICAID

## 2022-11-23 VITALS — WEIGHT: 155 LBS | HEIGHT: 68 IN | BODY MASS INDEX: 23.49 KG/M2

## 2022-11-23 VITALS — SYSTOLIC BLOOD PRESSURE: 118 MMHG | DIASTOLIC BLOOD PRESSURE: 72 MMHG

## 2022-11-23 DIAGNOSIS — M54.16: ICD-10-CM
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
## 2022-11-23 DIAGNOSIS — S39.012A: Primary | ICD-10-CM
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
## 2022-11-23 DIAGNOSIS — Z79.899: ICD-10-CM

## 2022-11-23 PROCEDURE — 96372 THER/PROPH/DIAG INJ SC/IM: CPT

## 2022-11-23 PROCEDURE — 99283 EMERGENCY DEPT VISIT LOW MDM: CPT
